# Patient Record
Sex: FEMALE | Race: WHITE | NOT HISPANIC OR LATINO | Employment: FULL TIME | URBAN - METROPOLITAN AREA
[De-identification: names, ages, dates, MRNs, and addresses within clinical notes are randomized per-mention and may not be internally consistent; named-entity substitution may affect disease eponyms.]

---

## 2020-12-31 ENCOUNTER — OFFICE VISIT (OUTPATIENT)
Dept: NEUROSURGERY | Facility: CLINIC | Age: 65
End: 2020-12-31

## 2020-12-31 VITALS
DIASTOLIC BLOOD PRESSURE: 70 MMHG | TEMPERATURE: 98.6 F | BODY MASS INDEX: 24.14 KG/M2 | WEIGHT: 144.9 LBS | HEIGHT: 65 IN | SYSTOLIC BLOOD PRESSURE: 130 MMHG

## 2020-12-31 DIAGNOSIS — I67.1 CEREBRAL ANEURYSM, NONRUPTURED: Primary | ICD-10-CM

## 2020-12-31 PROCEDURE — 99203 OFFICE O/P NEW LOW 30 MIN: CPT | Performed by: PHYSICIAN ASSISTANT

## 2020-12-31 RX ORDER — LEVOTHYROXINE SODIUM 0.05 MG/1
50 TABLET ORAL DAILY
COMMUNITY

## 2020-12-31 RX ORDER — DIPHENOXYLATE HYDROCHLORIDE AND ATROPINE SULFATE 2.5; .025 MG/1; MG/1
TABLET ORAL DAILY
COMMUNITY

## 2020-12-31 NOTE — PROGRESS NOTES
NAME: MARNIE JACKMAN   DOS: 2020  : 1955  PCP: Janis Damian PA    Chief Complaint: Dizziness    History of Present Illness: Ms. Jackman is a 65 y.o. female is seen today with a chief complaint of dizziness.  Patient has a significant past medical history of headache, arthritis, and anemia.  Patient notes that for the last few months she has been experiencing episodes of dizziness and just feeling different.  She notes that she has a history of migraines.  However, she denies any singular headache to suggest a subarachnoid or intracranial hemorrhage.  Patient has significant family history of first cousins who  due to the result of ruptured aneurysms.  Patient is seen today in evaluation.      Past Medical History:   Diagnosis Date   • Anemia    • Arthritis    • Headache    • Hearing loss        Past Surgical History:   Procedure Laterality Date   • TUBAL ABDOMINAL LIGATION               Review of Systems   Constitutional: Negative for activity change, appetite change, chills, diaphoresis, fatigue, fever and unexpected weight change.   HENT: Positive for hearing loss, postnasal drip, sinus pressure and sneezing. Negative for congestion, dental problem, drooling, ear discharge, ear pain, facial swelling, mouth sores, nosebleeds, rhinorrhea, sinus pain, sore throat, tinnitus, trouble swallowing and voice change.    Eyes: Negative for photophobia, pain, discharge, redness, itching and visual disturbance.   Respiratory: Negative for apnea, cough, choking, chest tightness, shortness of breath, wheezing and stridor.    Cardiovascular: Negative for chest pain, palpitations and leg swelling.   Gastrointestinal: Negative for abdominal distention, abdominal pain, anal bleeding, blood in stool, constipation, diarrhea, nausea, rectal pain and vomiting.   Endocrine: Negative for cold intolerance, heat intolerance, polydipsia, polyphagia and polyuria.   Genitourinary: Negative for decreased urine volume,  difficulty urinating, dyspareunia, dysuria, enuresis, flank pain, frequency, genital sores, hematuria, menstrual problem, pelvic pain, urgency, vaginal bleeding, vaginal discharge and vaginal pain.   Musculoskeletal: Negative for arthralgias, back pain, gait problem, joint swelling, myalgias, neck pain and neck stiffness.   Skin: Negative for color change, pallor, rash and wound.   Allergic/Immunologic: Negative for environmental allergies, food allergies and immunocompromised state.   Neurological: Positive for dizziness, light-headedness and headaches. Negative for tremors, seizures, syncope, facial asymmetry, speech difficulty, weakness and numbness.   Hematological: Negative for adenopathy. Does not bruise/bleed easily.   Psychiatric/Behavioral: Negative for agitation, behavioral problems, confusion, decreased concentration, dysphoric mood, hallucinations, self-injury, sleep disturbance and suicidal ideas. The patient is not nervous/anxious and is not hyperactive.    All other systems reviewed and are negative.       Medications:    Current Outpatient Medications:   •  Galcanezumab-gnlm (EMGALITY SC), Inject  under the skin into the appropriate area as directed., Disp: , Rfl:   •  levothyroxine (SYNTHROID, LEVOTHROID) 50 MCG tablet, Take 50 mcg by mouth Daily., Disp: , Rfl:   •  multivitamin (MULTI-VITAMIN DAILY PO), Take  by mouth Daily., Disp: , Rfl:   •  ZOLMitriptan (ZOMIG PO), Take  by mouth., Disp: , Rfl:     Allergies:  No Known Allergies    Social History     Tobacco Use   • Smoking status: Never Smoker   • Smokeless tobacco: Never Used   Substance Use Topics   • Alcohol use: Never     Frequency: Never   • Drug use: Never       Family History   Problem Relation Age of Onset   • Arthritis Mother    • Heart disease Father    • Tuberculosis Father        Review of Imaging:  CT of the head that was performed on 12/4/2020 was reviewed.  Study demonstrates possible aneurysm of the right MCA post contrast.      Vitals:    12/31/20 1058   BP: 130/70   Temp: 98.6 °F (37 °C)     Body mass index is 24.11 kg/m².    Physical Exam  Constitutional:       Appearance: Normal appearance. She is normal weight.   HENT:      Head: Normocephalic and atraumatic.      Mouth/Throat:      Mouth: Mucous membranes are moist.      Pharynx: Oropharynx is clear.   Eyes:      Extraocular Movements: Extraocular movements intact.      Conjunctiva/sclera: Conjunctivae normal.   Pulmonary:      Effort: Pulmonary effort is normal. No respiratory distress.   Musculoskeletal: Normal range of motion.         General: No deformity.   Skin:     General: Skin is warm and dry.   Neurological:      General: No focal deficit present.      Mental Status: She is alert and oriented to person, place, and time. Mental status is at baseline.      Gait: Gait is intact.      Deep Tendon Reflexes: Strength normal.       Neurologic Exam     Mental Status   Oriented to person, place, and time.     Cranial Nerves   Cranial nerves II through XII intact.     Motor Exam   Muscle bulk: normal  Right arm pronator drift: absent  Left arm pronator drift: absent    Strength   Strength 5/5 throughout.     Sensory Exam   Light touch normal.     Gait, Coordination, and Reflexes     Gait  Gait: normal      Diagnoses/Plan:    Ms. Jackman is a 65 y.o. female is seen today with a chief complaint of dizziness.  After reviewing the patient's CT scan, there is evidence of possible aneurysm of the right MCA.  Therefore, we will obtain a MRA of the head for further correlation.  We will have patient follow-up after the study is completed for further treatment recommendations.  Signs and symptoms were reviewed with patient that would warrant a sooner call to the clinic/911.  Patient noted understanding of this plan and willing to proceed.      Patient's Body mass index is 24.11 kg/m². BMI is above normal parameters. Recommendations include: educational material.         Dodie Nielsen  RAULITO

## 2021-01-14 ENCOUNTER — OFFICE VISIT (OUTPATIENT)
Dept: NEUROSURGERY | Facility: CLINIC | Age: 66
End: 2021-01-14

## 2021-01-14 ENCOUNTER — HOSPITAL ENCOUNTER (OUTPATIENT)
Dept: MRI IMAGING | Facility: HOSPITAL | Age: 66
Discharge: HOME | End: 2021-01-14
Admitting: PHYSICIAN ASSISTANT

## 2021-01-14 VITALS
WEIGHT: 151.4 LBS | BODY MASS INDEX: 25.22 KG/M2 | HEIGHT: 65 IN | DIASTOLIC BLOOD PRESSURE: 74 MMHG | SYSTOLIC BLOOD PRESSURE: 120 MMHG | TEMPERATURE: 98.6 F

## 2021-01-14 DIAGNOSIS — I67.1 CEREBRAL ANEURYSM, NONRUPTURED: ICD-10-CM

## 2021-01-14 DIAGNOSIS — R51.9 CHRONIC NONINTRACTABLE HEADACHE, UNSPECIFIED HEADACHE TYPE: Primary | ICD-10-CM

## 2021-01-14 DIAGNOSIS — G89.29 CHRONIC NONINTRACTABLE HEADACHE, UNSPECIFIED HEADACHE TYPE: Primary | ICD-10-CM

## 2021-01-14 PROCEDURE — 70544 MR ANGIOGRAPHY HEAD W/O DYE: CPT

## 2021-01-14 PROCEDURE — 99214 OFFICE O/P EST MOD 30 MIN: CPT | Performed by: PHYSICIAN ASSISTANT

## 2021-01-14 RX ORDER — MECLIZINE HCL 12.5 MG/1
TABLET ORAL
COMMUNITY
Start: 2020-11-14 | End: 2021-01-14 | Stop reason: SINTOL

## 2021-01-14 NOTE — PATIENT INSTRUCTIONS

## 2021-01-14 NOTE — PROGRESS NOTES
NAME: MARNIE JACKMAN   DOS: 2021  : 1955  PCP: Janis Damian PA    Chief Complaint:  Follow-up (MRA)      History of Present Illness: Ms. Jackman is a 65 y.o. female patient is seen today in follow-up.  Patient was initially referred to our office after work-up of dizziness and headaches demonstrated a possible aneurysm on her CT scan.  She noted a long history of migraines, however, denied a singular headache to suggest a subarachnoid or intracranial hemorrhage.  Patient did note she had significant family history of first cousin who  to to the result of a ruptured aneurysm.  Patient is seen today in follow-up with MRA.      Past Medical History:   Diagnosis Date   • Anemia    • Arthritis    • Headache    • Hearing loss        Past Surgical History:   Procedure Laterality Date   • TUBAL ABDOMINAL LIGATION               Review of Systems   Constitutional: Negative for activity change, appetite change, chills, diaphoresis, fatigue, fever and unexpected weight change.   HENT: Positive for hearing loss, postnasal drip, sinus pressure and sneezing. Negative for congestion, dental problem, drooling, ear discharge, ear pain, facial swelling, mouth sores, nosebleeds, rhinorrhea, sinus pain, sore throat, tinnitus, trouble swallowing and voice change.    Eyes: Negative for photophobia, pain, discharge, redness, itching and visual disturbance.   Respiratory: Negative for apnea, cough, choking, chest tightness, shortness of breath, wheezing and stridor.    Cardiovascular: Negative for chest pain, palpitations and leg swelling.   Gastrointestinal: Negative for abdominal distention, abdominal pain, anal bleeding, blood in stool, constipation, diarrhea, nausea, rectal pain and vomiting.   Endocrine: Negative for cold intolerance, heat intolerance, polydipsia, polyphagia and polyuria.   Genitourinary: Negative for decreased urine volume, difficulty urinating, dyspareunia, dysuria, enuresis, flank pain, frequency,  genital sores, hematuria, menstrual problem, pelvic pain, urgency, vaginal bleeding, vaginal discharge and vaginal pain.   Musculoskeletal: Negative for arthralgias, back pain, gait problem, joint swelling, myalgias, neck pain and neck stiffness.   Skin: Negative for color change, pallor, rash and wound.   Allergic/Immunologic: Negative for environmental allergies, food allergies and immunocompromised state.   Neurological: Positive for dizziness, light-headedness and headaches. Negative for tremors, seizures, syncope, facial asymmetry, speech difficulty, weakness and numbness.   Hematological: Negative for adenopathy. Does not bruise/bleed easily.   Psychiatric/Behavioral: Negative for agitation, behavioral problems, confusion, decreased concentration, dysphoric mood, hallucinations, self-injury, sleep disturbance and suicidal ideas. The patient is not nervous/anxious and is not hyperactive.    All other systems reviewed and are negative.           Medications:    Current Outpatient Medications:   •  Galcanezumab-gnlm (EMGALITY SC), Inject  under the skin into the appropriate area as directed., Disp: , Rfl:   •  levothyroxine (SYNTHROID, LEVOTHROID) 50 MCG tablet, Take 50 mcg by mouth Daily., Disp: , Rfl:   •  multivitamin (MULTI-VITAMIN DAILY PO), Take  by mouth Daily., Disp: , Rfl:   •  ZOLMitriptan (ZOMIG PO), Take  by mouth., Disp: , Rfl:     Allergies:  No Known Allergies    Social History     Tobacco Use   • Smoking status: Never Smoker   • Smokeless tobacco: Never Used   Substance Use Topics   • Alcohol use: Never     Frequency: Never   • Drug use: Never       Family History   Problem Relation Age of Onset   • Arthritis Mother    • Heart disease Father    • Tuberculosis Father        Review of Imaging:  MRI that was performed on 1/14/2021 was reviewed with Dr. Acuna.  Study demonstrates no evidence of aneurysm, significant stenosis, or other abnormality.    Vitals:    01/14/21 1259   BP: 120/74   Temp:       Body mass index is 25.19 kg/m².    Physical Exam  Constitutional:       Appearance: She is normal weight.   HENT:      Head: Normocephalic and atraumatic.   Eyes:      Extraocular Movements: Extraocular movements intact.      Pupils: Pupils are equal, round, and reactive to light.   Pulmonary:      Effort: Pulmonary effort is normal. No respiratory distress.   Skin:     General: Skin is warm and dry.   Neurological:      General: No focal deficit present.      Mental Status: She is alert and oriented to person, place, and time. Mental status is at baseline.      Gait: Gait is intact.   Psychiatric:         Speech: Speech normal.       Neurologic Exam     Mental Status   Oriented to person, place, and time.   Speech: speech is normal     Cranial Nerves     CN III, IV, VI   Pupils are equal, round, and reactive to light.    Motor Exam   Muscle bulk: normal    Gait, Coordination, and Reflexes     Gait  Gait: normal      Diagnoses/Plan:    Ms. Jackman is a 65 y.o. female is seen today in follow-up for work-up for possible cerebral aneurysm.  After reviewing the study, patient has no evidence of cerebral aneurysm on MRA.  Therefore, patient was encouraged to continue work-up of her symptomatology with her primary care physician.  Patient can follow-up with us on an as-needed basis.  All questions and concerns were answered.      Patient's Body mass index is 25.19 kg/m². BMI is above normal parameters. Recommendations include: educational material.           Dodie Nielsen PA-C